# Patient Record
(demographics unavailable — no encounter records)

---

## 2018-01-01 NOTE — NBPN
===========================

Datetime: 2018 13:43

===========================

   

Nsy Prov Gen Appearance:  Within Normal Limits

Nsy Prov Skin:  Within Normal Limits; Jaundice

Nsy Prov Neuro:  Normal Tone; [place]; Grasp; Root; Suck

Nsy Prov Musculoskeletal:  Within Normal Limits; Full Range of Motion; Spontaneous Movement All Extre
mities; Intact Clavicles; Clavicles without Crepitus; Gluteal Folds Symmetrical; Spine Within Normal 
Limits; No Sacral Dimple/Cyst

Nsy Prov Head:  Normal Fontanelles; Normocephalic; Sutures WNL

Nsy Prov EENT:  Mouth Within Normal Limits; Ears Within Normal Limits; Eyes Within Normal Limits; Eye
s Red Reflex Bilaterally; Nose Within Normal Limits; Face Within Normal Limits

Nsy Prov Cardiovascular:  Within Normal Limits; Normal Pulses

Nsy Prov Respiratory:  Within Normal Limits

Nsy Prov GI:  Within Normal Limits; Soft; Normal Liver; Non Palpable Spleen; Patent Anus

Nsy Prov Umbilicus:  Within Normal Limits; Three Vessel Cord

Nsy Prov :  Normal Male Genitalia

Nsy Prov Impression:  Healthy Term [place]; Vital Signs Appropriate; Bonding Appropriately; Voiding a
nd Stooling

Nsy Prov Plan:  Continue [place] Care

Nsy Prov Impression/Plan Details:  well male, jaundice.

   c/s

## 2018-01-01 NOTE — NBADN
===========================

Datetime: 2018 11:38

===========================

   

Nsy Prov Gen Appearance:  Within Normal Limits

Nsy Prov Gen Appearance:  Within Normal Limits

Nsy Prov Skin:  Within Normal Limits

Nsy Prov Neuro:  Normal Tone; [place]; Grasp; Root; Suck

Nsy Prov Musculoskeletal:  Within Normal Limits; Full Range of Motion; Spontaneous Movement All Extre
mities; Intact Clavicles; Clavicles without Crepitus; Gluteal Folds Symmetrical; Spine Within Normal 
Limits; No Sacral Dimple/Cyst

Nsy Prov Head:  Normal Fontanelles; Normocephalic; Sutures WNL

Nsy Prov EENT:  Mouth Within Normal Limits; Ears Within Normal Limits; Eyes Within Normal Limits; Eye
s Red Reflex Bilaterally; Nose Within Normal Limits; Face Within Normal Limits

Nsy Prov Cardiovascular:  Within Normal Limits; Normal Pulses

Nsy Prov Respiratory:  Within Normal Limits

Nsy Prov GI:  Within Normal Limits; Soft; Normal Liver; Non Palpable Spleen; Patent Anus

Nsy Prov Umbilicus:  Within Normal Limits; Three Vessel Cord

Nsy Prov :  Normal Male Genitalia

Nsy Prov Impression:  Healthy Term ; Vital Signs Appropriate; Bonding Appropriately; Voiding a
nd Stooling

Nsy Prov Plan:  Continue  Care

Nsy Prov Impression/Plan Details:  Well baby boy.

   

===========================

Datetime: 2018 11:36

===========================

   

Mother's Rule Inc Maternal Age:  Age >=35 at JENAE not specified

Mother's Rule Thalassemia:  Thalassemia History not specified

Mother's Rule Neural Tube Defect:  Neural Tube Defect History not specified

Mother's Rule Congenital Heart:  Congenital Heart Defect not specified

Mother's Rule Down Syndrome:  Down Syndrome History not specified

Mother's Rule Vincenzo-Sachs:  Vincenzo-Sachs History not specified

Mother's Rule [place]:  [place] History not specified

Mother's Rule Familial Dysauto:  Familial Dysautonomia History not specified

Mother's Rule Sickle Cell:  Sickle Cell Disease/Trait History not specified

Mother's Rule Hemophilia:  Hemophilia/Blood Disorder History not specified

Mother's Rule Muscular Dystrophy:  Muscular Dystrophy History not specified

Mother's Rule Cystic Fibrosis:  Cystic Fibrosis History not specified

Mother's Rule [place]'s Chor:  [place]'s Chorea History not specified

Mother's Rule Mental Retardation:  Mental Retardation/Autism History not specified

Mother's Rule Fragile X:  Fragile X Testing History not specified

Mother's Rule Oth Inherited DO:  Other Inherited/Chromosomal Disorders not specified

Mother's Rule Maternal Metabolic:  Maternal Metabolic History not specified

Mother's Rule FOB Birth Defects:  Pt Father or FOB Birth Defect History not specified

Mother's Rule Hx Stillborn MBL:  Loss/Stillborn History not specified

Mother's Rule Other Genetic Hx:  Other Genetic History not specified

Mother's Rule Drugs/Medications:  Drugs/Medications History not specified

Mother's Rule Gonorrhea:   Gonorrhea History Not Specified

Mother's Rule Chlamydia:  Chlamydia History not specified

Mother's Rule Syphilis:  Syphilis History not specified

Mother's Rule HIV/AIDS Exp:  HIV/Aids Exposure not specified

Mother's Rule HPV:  Human Papillomavirus History not specified

Mother's Rule Genital Herpes:  Genital Herpes not specified

Mother's Rule TB:  Tuberculosis History not specified

Mother's Rule Hepatitis:  Hepatitis History Not Specified

Mother's Rule Rash or Viral Ill:  Rash or Viral Illness History not specified

Mother's Rule Diabetes:  Diabetes History not specified

Mother's Rule Hypertension MBL:  History of Hypertension Not Specified

Mother's Rule Heart Disease:  Heart Disease History not specified

Mother's Rule Autoimmune:  Autoimmune Disorder History not specified

Mother's Rule Kidney Disease:  History of Kidney Disease/UTI not specified

Mother's Rule Neurologic:  Neurologic/Epilepsy Disorders not specified

Mother's Rule Psych Disorders:  Psychiatric Disorder History not specified

Mother's Rule Depression/PP Dep:  Depression/Postpartum Depression History not specified

Mother's Rule Hepaitis/tLiver:  History of Hepatitis/Liver Disease not specified

Mother's Rule Varicos/Phlebitis:  Varicosities/Phlebitis History Not Specified

Mother's Rule Thyroid Dysfunct:  Thyroid Dysfunction not specified

Mother's Rule Trauma/Violence:  Trauma/Violence History Not Specified

Mother's Rule Blood Transfusion:  Blood Transfusion History not specified

Mother's Rule Sensitization:  D (Rh) Sensitization not specified

Mother's Rule Pulmonary:  Pulmonary (Asthma, TB) History not specified

Mother's Rule Breast:  Breast History not specified

Mother's Rule Gyn Surgery:  Gyn Surgery Hx not specified

Mother's Rule Hosp/Surgery:  Hospitalization/Surgery History not specified

Mother's Rule Anesthetic Comp:  Anesthetic Complications Hx not specified

Mother's Rule Abnormal Pap:  Abnormal Pap Smear not specified

Mother's Rule Uterine Anomaly:  Uterine Anomaly/TAN not specified

Mother's Rule Infertility:  Infertility Not Specified

Mother's Rule ART Treatment:  ART Treatment History not specified

Mother's Rule Other Med Disease:  Other Medical Diseases History not specified

Mother's Rule Family History:  Significant Family History not specified

   

===========================

Datetime: 2018 08:09

===========================

   

Mother's PT-AGE:  40

Mother's Marital Status:

## 2018-01-01 NOTE — DELATT
===========================

Datetime: 2018 11:36

===========================

   

Del Note Departure Status:   Nursery

Del Note Time:  30

Del Note Status:  FT male, AGA, PCS. ABG 8/9. PPV with O2 for +/- 20 sek.

Del Note Reason for Attend Other:  breach

Del Note Interventions:  Assessment; Stimulation; Drying

Del Note Reason for Attending:   Section

HOLLY/NICU Del Atten Note Adm DT:  2018 11:38

## 2018-01-01 NOTE — NBPN
===========================

Datetime: 2018 09:41

===========================

   

Nsy Prov Gen Appearance:  Within Normal Limits

Nsy Prov Skin:  Within Normal Limits

Nsy Prov Neuro:  Normal Tone; [place]; Grasp; Root; Suck

Nsy Prov Musculoskeletal:  Within Normal Limits; Full Range of Motion; Spontaneous Movement All Extre
mities; Intact Clavicles; Clavicles without Crepitus; Gluteal Folds Symmetrical; Spine Within Normal 
Limits; No Sacral Dimple/Cyst

Nsy Prov Head:  Normal Fontanelles; Normocephalic; Sutures WNL

Nsy Prov EENT:  Mouth Within Normal Limits; Ears Within Normal Limits; Eyes Within Normal Limits; Eye
s Red Reflex Bilaterally; Nose Within Normal Limits; Face Within Normal Limits

Nsy Prov Cardiovascular:  Within Normal Limits

Nsy Prov Respiratory:  Within Normal Limits

Nsy Prov GI:  Within Normal Limits; Soft; Normal Liver; Non Palpable Spleen

Nsy Prov Umbilicus:  Within Normal Limits

Nsy Prov :  Normal Male Genitalia

Nsy Prov Impression:  Healthy Term [place]; Vital Signs Appropriate; Bonding Appropriately; Voiding a
nd Stooling

Nsy Prov Plan:  Continue  Care

   

===========================

Datetime: 2018 11:38

===========================

   

Nsy Prov Impression/Plan Details:  Well baby boy.